# Patient Record
(demographics unavailable — no encounter records)

---

## 2024-11-19 NOTE — HISTORY OF PRESENT ILLNESS
[6] : 6 [Burning] : burning [Dull/Aching] : dull/aching [Squeezing] : squeezing [Sleep] : sleep [Meds] : meds [Injection therapy] : injection therapy [Lying in bed] : lying in bed [FreeTextEntry5] : 81 y/o M presents for f/u eval of the Rt. knee today.

## 2024-11-19 NOTE — ASSESSMENT
[FreeTextEntry1] : The patient is here for right knee pain. He has had gel injections with mild relief and CSI with better relief. 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 His knee is more painful. His pain is consistent with OA. He has pain with overuse and using stairs. He has both groin pain and knee pain with ambulation. He has pain with putting on socks and shoes to his groin. The patient denies new trauma. He denies paresthesias. The patient uses occasional oral AIs. He has not has any other treatment.   Right knee exam: Well appearing male in no apparent distress. No rashes, scars, or abrasions. Neurovascularly intact. Tender to palpation at medial joint line. Ranging from 0-120. Mild varus deformity. Anterior/posterior drawer, - Mcmurrays. - Lachmans.  The patient received a right knee CSI. He tolerated it well. The patient will return in 4-6 weeks for euflexxa injections.

## 2025-05-20 NOTE — HISTORY OF PRESENT ILLNESS
[6] : 6 [Burning] : burning [Dull/Aching] : dull/aching [Squeezing] : squeezing [Sleep] : sleep [Meds] : meds [Injection therapy] : injection therapy [Lying in bed] : lying in bed

## 2025-05-22 NOTE — ASSESSMENT
[FreeTextEntry1] : The patient is here for right knee pain. He had a previous CSI in Nov. with very good relief. 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 His knee is more painful. His pain is consistent with OA. He has pain with overuse and using stairs. He has both groin pain and knee pain with ambulation. He has pain with putting on socks and shoes to his groin. The patient denies new trauma. He denies paresthesias. The patient uses occasional oral AIs. He has not has any other treatment.   Right knee exam: Well appearing male in no apparent distress. No rashes, scars, or abrasions. Neurovascularly intact. Tender to palpation at medial joint line. Ranging from 0-120. Mild varus deformity. Anterior/posterior drawer, - Mcmurrays. - Lachmans.  The patient received a right knee CSI. He tolerated it well. He will return as needed.